# Patient Record
Sex: FEMALE | Race: WHITE | NOT HISPANIC OR LATINO | Employment: OTHER | URBAN - METROPOLITAN AREA
[De-identification: names, ages, dates, MRNs, and addresses within clinical notes are randomized per-mention and may not be internally consistent; named-entity substitution may affect disease eponyms.]

---

## 2017-02-09 NOTE — PATIENT DISCUSSION
(Y36.0374) Primary open-angle glaucoma right eye severe stage - Assesment : Examination revealed Primary Open Angle Glaucoma OD. IOP stable today OU. Hx of severe NFL loss on OCT OD. - Plan : Continue Latanoprost QHS OD. Monitor for changes. Advised patient to call our office when decreased vision or increased eye pain.  RV 6 months VF 24-2/Exam.

## 2017-02-09 NOTE — PATIENT DISCUSSION
(W44.8799) Primary open-angle glaucoma left eye mild stage - Assesment : Examination revealed Primary Open Angle Glaucoma OS. IOP stable today OU. - Plan : Monitor for changes. Advised patient to call our office when decreased vision or increased eye pain.

## 2017-08-28 NOTE — PATIENT DISCUSSION
(L29.5812) Primary open-angle glaucoma right eye severe stage - Assesment : Examination revealed Primary Open Angle Glaucoma OD. IOP stable today OU. VF OD - inferior superior nasal steps/ OS - WNL, high errors. - Plan : Continue Latanoprost QHS OD. Monitor for changes. Advised patient to call our office when decreased vision or increased eye pain. RV 6 months Tn check/ONH OCT.

## 2017-08-28 NOTE — PATIENT DISCUSSION
(L99.824) Vitreous degeneration, left eye - Assesment : Examination revealed PVD OS. - Plan : Monitor for changes. Advised patient to call our office with decreased vision or an increase in flashes and/or floaters.

## 2017-08-28 NOTE — PATIENT DISCUSSION
(P61.4619) Primary open-angle glaucoma left eye mild stage - Assesment : Examination revealed Primary Open Angle Glaucoma OS. IOP stable today OU. - Plan : Monitor for changes. Advised patient to call our office when decreased vision or increased eye pain.

## 2018-02-12 NOTE — PATIENT DISCUSSION
(E74.5038) Primary open-angle glaucoma right eye severe stage - Assesment : Examination revealed Primary Open Angle Glaucoma OD. IOP stable today OU. ONH OCT Generalized decrease NFL OD. WNL OS. - Plan : Continue Latanoprost QHS OD. Monitor for changes. Advised patient to call our office when decreased vision or increased eye pain. RV 6 months Exam/ONH OCT.

## 2018-08-13 NOTE — PATIENT DISCUSSION
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 - Plan : Recommend Monitor at this time. Advised that this may progress and worsen and may warrant surgery to tighten lower lid.

## 2018-08-13 NOTE — PATIENT DISCUSSION
(H02.125) Mechanical ectropion of left lower eyelid - Assesment : Examination revealed mechanical ectropion. - Plan : see plan #2

## 2018-08-13 NOTE — PATIENT DISCUSSION
(G05.3249) Primary open-angle glaucoma right eye severe stage - Assesment : Examination revealed Primary Open Angle Glaucoma OD. IOP stable today OU. OCT nerve today suggests superior,nasal and inferior thinning NFL OD, OS WNL. - Plan : Continue Latanoprost QHS OD. Monitor for changes. Advised patient to call our office when decreased vision or increased eye pain.   RTC 6 TN CK

## 2019-02-11 NOTE — PATIENT DISCUSSION
(H02.122) Mechanical ectropion of right lower eyelid - Assesment : Examination revealed mechanical ectropion - Plan : Recommend Monitor at this time

## 2019-02-11 NOTE — PATIENT DISCUSSION
(F42.3865) Primary open-angle glaucoma right eye severe stage - Assesment : Examination revealed  severe Primary Open Angle Glaucoma OD. IOP stable today secondary to latanoporst compliance - Plan : Monitor for changes. Advised patient to call our office when decreased vision or increased eye pain. Continue Latanoprost QHS OD.    RTC 6 months Exam-octo

## 2019-02-11 NOTE — PATIENT DISCUSSION
(J95.6396) Primary open-angle glaucoma left eye mild stage - Assesment : Examination revealed Primary Open Angle Glaucoma OS. IOP stable today  OS - Plan : Monitor for changes. Advised patient to call our office when decreased vision or increased eye pain.

## 2019-08-12 NOTE — PATIENT DISCUSSION
(U73.6101) Primary open-angle glaucoma left eye mild stage - Assesment : Examination revealed Primary Open Angle Glaucoma. OCT ONH performed: stable today. - Plan : Monitor for IOP and NFL changes with visits and OCTs. Continue Latanoprost OD qhs.  RTC in 6 months for IOP Check (refraction if needed by Pt), sooner if problems or changes.

## 2019-08-12 NOTE — PATIENT DISCUSSION
(N21.4903) Primary open-angle glaucoma right eye severe stage - Assesment : Examination revealed  severe Primary Open Angle Glaucoma OD. OCT ONH performed: stable today. - Plan : Monitor for IOP and NFL changes with visits and OCTs. Continue Latanoprost OD qhs.  RTC in 6 months for IOP Check (refraction if needed by Pt), sooner if problems or changes.

## 2019-08-12 NOTE — PATIENT DISCUSSION
(H18.413) Arcus senilis, bilateral - Assesment : Examination revealed arcus senilis. - Plan : Monitor.

## 2020-12-08 ENCOUNTER — PREPPED CHART (OUTPATIENT)
Dept: URBAN - METROPOLITAN AREA CLINIC 43 | Facility: CLINIC | Age: 65
End: 2020-12-08